# Patient Record
Sex: FEMALE | Race: WHITE | NOT HISPANIC OR LATINO | Employment: UNEMPLOYED | URBAN - METROPOLITAN AREA
[De-identification: names, ages, dates, MRNs, and addresses within clinical notes are randomized per-mention and may not be internally consistent; named-entity substitution may affect disease eponyms.]

---
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
## 2024-01-29 ENCOUNTER — TELEPHONE (OUTPATIENT)
Dept: FAMILY MEDICINE CLINIC | Facility: CLINIC | Age: 19
End: 2024-01-29

## 2024-01-29 NOTE — TELEPHONE ENCOUNTER
Mother is calling to schedule the removal of   Nexplanon for patient.    Mother can be reached at [phone]

## 2024-01-30 NOTE — TELEPHONE ENCOUNTER
Called, mom will talk to daughter about times that would work with her work schedule. Will call back tomorrow.

## 2024-02-22 ENCOUNTER — PROCEDURE VISIT (OUTPATIENT)
Dept: FAMILY MEDICINE CLINIC | Facility: CLINIC | Age: 19
End: 2024-02-22

## 2024-02-22 VITALS
RESPIRATION RATE: 18 BRPM | BODY MASS INDEX: 22.16 KG/M2 | WEIGHT: 120.4 LBS | HEIGHT: 62 IN | OXYGEN SATURATION: 98 % | SYSTOLIC BLOOD PRESSURE: 133 MMHG | DIASTOLIC BLOOD PRESSURE: 88 MMHG | HEART RATE: 81 BPM | TEMPERATURE: 98.7 F

## 2024-02-22 DIAGNOSIS — Z30.46 NEXPLANON REMOVAL: Primary | ICD-10-CM

## 2024-02-22 PROCEDURE — 11982 REMOVE DRUG IMPLANT DEVICE: CPT | Performed by: FAMILY MEDICINE

## 2024-02-22 PROCEDURE — 99213 OFFICE O/P EST LOW 20 MIN: CPT | Performed by: FAMILY MEDICINE

## 2024-02-22 NOTE — PROGRESS NOTES
"Universal Protocol:  Consent: Verbal consent obtained. Written consent not obtained.  Risks and benefits: risks, benefits and alternatives were discussed  Consent given by: patient  Time out: Immediately prior to procedure a \"time out\" was called to verify the correct patient, procedure, equipment, support staff and site/side marked as required.  Patient understanding: patient states understanding of the procedure being performed  Patient consent: the patient's understanding of the procedure matches consent given  Procedure consent: procedure consent matches procedure scheduled  Relevant documents: relevant documents present and verified  Test results: test results available and properly labeled  Site marked: the operative site was marked  Required items: required blood products, implants, devices, and special equipment available  Patient identity confirmed: verbally with patient  Remove and insert drug implant    Date/Time: 2/22/2024 4:20 PM    Performed by: Valeria Marroquin DO  Authorized by: Valeria Marroquin DO    Indication:     Indication: Presence of non-biodegradable drug delivery implant    Pre-procedure:     Pre-procedure timeout performed: yes      Prepped with: alcohol 70% and povidone-iodine      Local anesthetic:  Lidocaine with epinephrine    The site was cleaned and prepped in a sterile fashion: yes    Procedure:     Procedure:  Removal    Small stab incision was made in arm: yes      Left/right:  Left    Preloaded contraceptive capsule trocar was placed subdermally: no      Visualization of implant was obtained: yes      Site was closed with steri-strips and pressure bandage applied: yes    Comments:      0.5 cm incision was made at the distal end of the Nexplanon device.  Device was threaded through insertion site and removed with forceps without difficulty.  Steri-Strips were placed over the site after hemostasis was obtained.      "

## 2024-04-19 ENCOUNTER — OFFICE VISIT (OUTPATIENT)
Dept: FAMILY MEDICINE CLINIC | Facility: CLINIC | Age: 19
End: 2024-04-19

## 2024-04-19 ENCOUNTER — TELEPHONE (OUTPATIENT)
Dept: FAMILY MEDICINE CLINIC | Facility: CLINIC | Age: 19
End: 2024-04-19

## 2024-04-19 VITALS
HEART RATE: 81 BPM | BODY MASS INDEX: 21.49 KG/M2 | DIASTOLIC BLOOD PRESSURE: 83 MMHG | SYSTOLIC BLOOD PRESSURE: 124 MMHG | OXYGEN SATURATION: 98 % | TEMPERATURE: 98.7 F | WEIGHT: 116.8 LBS | HEIGHT: 62 IN | RESPIRATION RATE: 18 BRPM

## 2024-04-19 DIAGNOSIS — Z11.4 SCREENING FOR HIV (HUMAN IMMUNODEFICIENCY VIRUS): ICD-10-CM

## 2024-04-19 DIAGNOSIS — N93.9 ABNORMAL UTERINE AND VAGINAL BLEEDING, UNSPECIFIED: ICD-10-CM

## 2024-04-19 DIAGNOSIS — Z00.00 ANNUAL PHYSICAL EXAM: Primary | ICD-10-CM

## 2024-04-19 DIAGNOSIS — Z11.59 ENCOUNTER FOR HEPATITIS C SCREENING TEST FOR LOW RISK PATIENT: ICD-10-CM

## 2024-04-19 PROCEDURE — 99395 PREV VISIT EST AGE 18-39: CPT | Performed by: FAMILY MEDICINE

## 2024-04-19 NOTE — PROGRESS NOTES
"ADULT ANNUAL PHYSICAL  [facility]    NAME: Laverne Summers  AGE: 18 y.o. SEX: female  : 2005     DATE: 2024     Assessment and Plan:     Problem List Items Addressed This Visit          Genitourinary    Abnormal uterine and vaginal bleeding, unspecified       Other    Annual physical exam - Primary     Other Visit Diagnoses       Screening for HIV (human immunodeficiency virus)        Relevant Orders    HIV 1/2 AG/AB w Reflex [facility] for 2 yr old and above    Encounter for hepatitis C screening test for low risk patient        Relevant Orders    Hepatitis C antibody          18 y.o. female here for annual physical exam.  She has no questions or concerns at this time.  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    Immunization:  - COVID -- 2021, 2021, 2021  - TDaP -- 2005, 2004, 2005  - Influenza -- 10/7/23  Screening: I have discussed each screening test below (per USPSTF guideline) with patient, to which patient expresses understanding and is agreeable to plan  - Obesity -- Negative   - Hypertension -- Negative   - Nicotine/EtOH/Drugs -- Negative   - Depression -- Negative   Depression: Not at risk (2024)    PHQ-2     PHQ-2 Score: 0     - HIV -- Ordered   - Hep C -- Ordered   - T2DM -- Not indicated.   No results found for: \"HGBA1C\"  Lab Results   Component Value Date    GLUF 103 (H) 10/22/2023    CREATININE 0.72 10/22/2023     - Intimate partner violence -- Negative     Immunizations and preventive care screenings were discussed with patient today. Appropriate education was printed on patient's after visit summary.    Counseling:  Alcohol/drug use: discussed moderation in alcohol intake, the recommendations for healthy alcohol use, and avoidance of illicit drug use.  Dental Health: discussed importance of regular tooth brushing, " flossing, and dental visits.  Injury prevention: discussed safety/seat belts, safety helmets, smoke detectors, carbon dioxide detectors, and smoking near bedding or upholstery.  Sexual health: discussed sexually transmitted diseases, partner selection, use of condoms, avoidance of unintended pregnancy, and contraceptive alternatives.  Exercise: the importance of regular exercise/physical activity was discussed. Recommend exercise 3-5 times per week for at least 30 minutes.          No follow-ups on file.     Chief Complaint:     Chief Complaint   Patient presents with    Physical Exam     Annual Physical Exam. No questions or  concerns      History of Present Illness:     Adult Annual Physical   Patient here for a comprehensive physical exam. The patient reports no problems.    Diet and Physical Activity  Diet/Nutrition: well balanced diet.   Exercise: no formal exercise.      Depression Screening  PHQ-2/9 Depression Screening           General Health  Sleep: gets 7-8 hours of sleep on average.   Hearing: normal - bilateral.  Vision: wears glasses.   Dental: regular dental visits.       /GYN Health  Follows with gynecology? no   Last menstrual period: 4/1/2024, not heavy bleeding lasting 5-6 days. Coming monthly   Contraceptive method: oral contraceptives.  History of STDs?: no.     Advanced Care Planning  Do you have an advanced directive? no  Do you have a durable medical power of ? no  ACP document given to the patient? no      Review of Systems:     Review of Systems   Constitutional:  Negative for chills and fever.   Eyes:  Negative for visual disturbance.   Respiratory:  Negative for cough and shortness of breath.    Cardiovascular:  Negative for chest pain and palpitations.   Gastrointestinal:  Negative for abdominal pain and vomiting.   Genitourinary:  Negative for dysuria and hematuria.   Musculoskeletal:  Negative for arthralgias and back pain.   Skin:  Negative for color change and rash.    Neurological:  Negative for dizziness and headaches.   Psychiatric/Behavioral:  Behavioral problem: prev.    All other systems reviewed and are negative.     Past Medical History:     No past medical history on file.   Past Surgical History:     Past Surgical History:   Procedure Laterality Date    TOOTH EXTRACTION  2014      Social History:     Social History     Socioeconomic History    Marital status: Single     Spouse name: None    Number of children: None    Years of education: None    Highest education level: None   Occupational History    None   Tobacco Use    Smoking status: Never    Smokeless tobacco: Never   Vaping Use    Vaping status: Never Used   Substance and Sexual Activity    Alcohol use: Not Currently    Drug use: Never    Sexual activity: Not Currently   Other Topics Concern    None   Social History Narrative    None     Social Determinants of Health     Financial Resource Strain: Low Risk  (4/19/2024)    Overall Financial Resource Strain (CARDIA)     Difficulty of Paying Living Expenses: Not hard at all   Food Insecurity: No Food Insecurity (4/19/2024)    Hunger Vital Sign     Worried About Running Out of Food in the Last Year: Never true     Ran Out of Food in the Last Year: Never true   Transportation Needs: No Transportation Needs (1/25/2023)    PRAPARE - Transportation     Lack of Transportation (Medical): No     Lack of Transportation (Non-Medical): No   Physical Activity: Sufficiently Active (4/19/2024)    Exercise Vital Sign     Days of Exercise per Week: 5 days     Minutes of Exercise per Session: 30 min   Stress: No Stress Concern Present (4/19/2024)    [place] [employer] - Occupational Stress Questionnaire     Feeling of Stress : Not at all   Social Connections: Unknown (4/19/2024)    Social Connection and Isolation Panel [NHANES]     Frequency of Communication with Friends and Family: More than three times a week     Frequency of Social Gatherings with Friends  "and Family: More than three times a week     Attends Christianity Services: Never     Active Member of Clubs or Organizations: No     Attends Club or Organization Meetings: Never     Marital Status: Not on file   Intimate Partner Violence: Not At Risk (4/19/2024)    Humiliation, Afraid, Rape, and Kick questionnaire     Fear of Current or Ex-Partner: No     Emotionally Abused: No     Physically Abused: No     Sexually Abused: No   Housing Stability: Low Risk  (4/19/2024)    Housing Stability Vital Sign     Unable to Pay for Housing in the Last Year: No     Number of Places Lived in the Last Year: 1     Unstable Housing in the Last Year: No      Family History:     Family History   Problem Relation Age of Onset    No Known Problems Mother       Current Medications:     Current Outpatient Medications   Medication Sig Dispense Refill    norgestrel-ethinyl estradiol (Low-Ogestrel) 0.3 mg-30 mcg per tablet Take 1 tablet by mouth daily Take 1 tablet daily 30 tablet 3    norgestrel-ethinyl estradiol (Low-Ogestrel) 0.3 mg-30 mcg per tablet Take 1 tablet by mouth daily Take 1 tablet daily 30 tablet 3     No current facility-administered medications for this visit.      Allergies:     No Known Allergies   Physical Exam:     /83 (BP Location: Left arm, Patient Position: Sitting, Cuff Size: Standard)   Pulse 81   Temp 98.7 °F (37.1 °C) (Temporal)   Resp 18   Ht 5' 2\" (1.575 m)   Wt 53 kg (116 lb 12.8 oz)   SpO2 98%   BMI 21.36 kg/m²     Physical Exam  Vitals reviewed.   Constitutional:       Appearance: Normal appearance.   HENT:      Head: Normocephalic and atraumatic.      Nose: Nose normal.      Mouth/Throat:      Mouth: Mucous membranes are moist.      Pharynx: Oropharynx is clear.   Eyes:      Conjunctiva/sclera: Conjunctivae normal.   Cardiovascular:      Rate and Rhythm: Normal rate and regular rhythm.      Heart sounds: Normal heart sounds. No murmur heard.     No friction rub. No gallop.   Pulmonary:      " Effort: Pulmonary effort is normal.      Breath sounds: No wheezing, rhonchi or rales.   Abdominal:      General: Abdomen is flat. Bowel sounds are normal.      Palpations: Abdomen is soft. There is no mass.      Tenderness: There is no abdominal tenderness.   Musculoskeletal:         General: Normal range of motion.      Cervical back: Neck supple.   Skin:     General: Skin is warm and dry.   Neurological:      Mental Status: She is alert.          DO MAURICIO Sims [facility]

## 2024-05-29 ENCOUNTER — DOCUMENTATION (OUTPATIENT)
Dept: OTHER | Facility: HOSPITAL | Age: 19
End: 2024-05-29

## 2024-05-29 DIAGNOSIS — N93.9 ABNORMAL UTERINE AND VAGINAL BLEEDING, UNSPECIFIED: Primary | ICD-10-CM

## 2024-05-29 RX ORDER — NORGESTREL AND ETHINYL ESTRADIOL 0.3-0.03MG
1 KIT ORAL DAILY
Qty: 90 TABLET | Refills: 11 | Status: SHIPPED | OUTPATIENT
Start: 2024-05-29

## 2025-04-27 ENCOUNTER — OFFICE VISIT (OUTPATIENT)
Dept: URGENT CARE | Age: 20
End: 2025-04-27
Payer: COMMERCIAL

## 2025-04-27 VITALS
OXYGEN SATURATION: 100 % | SYSTOLIC BLOOD PRESSURE: 121 MMHG | WEIGHT: 130 LBS | DIASTOLIC BLOOD PRESSURE: 82 MMHG | BODY MASS INDEX: 22.2 KG/M2 | RESPIRATION RATE: 20 BRPM | HEART RATE: 100 BPM | TEMPERATURE: 98 F | HEIGHT: 64 IN

## 2025-04-27 DIAGNOSIS — H10.31 ACUTE CONJUNCTIVITIS OF RIGHT EYE, UNSPECIFIED ACUTE CONJUNCTIVITIS TYPE: Primary | ICD-10-CM

## 2025-04-27 PROCEDURE — 99203 OFFICE O/P NEW LOW 30 MIN: CPT | Performed by: STUDENT IN AN ORGANIZED HEALTH CARE EDUCATION/TRAINING PROGRAM

## 2025-04-27 RX ORDER — OFLOXACIN 3 MG/ML
2 SOLUTION/ DROPS OPHTHALMIC 4 TIMES DAILY
Qty: 5 ML | Refills: 0 | Status: SHIPPED | OUTPATIENT
Start: 2025-04-27

## 2025-04-27 NOTE — PATIENT INSTRUCTIONS
Please use antibiotic eyedrops as prescribed.  Avoid use of your contacts until symptoms have fully resolved.  Use clean warm compress with a moist warm washcloth 3-4 times daily to help improve your symptoms.    If your symptoms are not adequately improving in the coming week, please schedule a recheck appoint with your PCP.  If you have any severe worsening symptoms such as severe pain, rapidly spreading redness, fevers, loss of vision, please go to the ER for further evaluation.

## 2025-04-27 NOTE — LETTER
April 27, 2025     Patient: Laverne Summers   YOB: 2005   Date of Visit: 4/27/2025       To Whom it May Concern:    Laverne Summers was seen in my clinic on 4/27/2025.  Please excuse her from work on 4/28/2025.    If you have any questions or concerns, please don't hesitate to call.         Sincerely,          Henna Metz, DO

## 2025-04-27 NOTE — PROGRESS NOTES
[facility]        NAME: Laverne Summers is a 19 y.o. female  : 2005    MRN: [id number]  DATE: 2025  TIME: 11:17 AM    Assessment and Plan   Acute conjunctivitis of right eye, unspecified acute conjunctivitis type [H10.31]  1. Acute conjunctivitis of right eye, unspecified acute conjunctivitis type  ofloxacin (OCUFLOX) 0.3 % ophthalmic solution            Patient Instructions   Please use antibiotic eyedrops as prescribed.  Avoid use of your contacts until symptoms have fully resolved.  Use clean warm compress with a moist warm washcloth 3-4 times daily to help improve your symptoms.    If your symptoms are not adequately improving in the coming week, please schedule a recheck appoint with your PCP.  If you have any severe worsening symptoms such as severe pain, rapidly spreading redness, fevers, loss of vision, please go to the ER for further evaluation.    Follow up with PCP in 3-5 days.  Proceed to  ER if symptoms worsen.    If tests have been performed at [facility], our office will contact you with results if changes need to be made to the care plan discussed with you at the visit.  You can review your full results on [facility].    Chief Complaint     Chief Complaint   Patient presents with    Conjunctivitis     Pt has swelling, redness, watering and itching to her right eye.          History of Present Illness       Patient presents for evaluation of right eye symptoms.  Started yesterday, she noticed irritation starting, she put her contact in, but felt they fall out due to the tearing and irritation.  She noticed increased redness of the white of her eye and some puffiness of her eyelid today.  Also noticed crusting overnight.  She did not feel that she scraped the eye with her contact, no other injuries.  Otherwise in her usual state of health.    Conjunctivitis         Review of Systems   Review of Systems   All other systems reviewed and are negative.        Current  "Medications       Current Outpatient Medications:     norgestrel-ethinyl estradiol (Low-Ogestrel) 0.3 mg-30 mcg per tablet, Take 1 tablet by mouth daily Take 1 tablet daily, Disp: 30 tablet, Rfl: 3    ofloxacin (OCUFLOX) 0.3 % ophthalmic solution, Administer 2 drops to the right eye 4 (four) times a day, Disp: 5 mL, Rfl: 0    norgestrel-ethinyl estradiol (Low-Ogestrel) 0.3 mg-30 mcg per tablet, Take 1 tablet by mouth daily Take 1 tablet daily (Patient not taking: Reported on 4/27/2025), Disp: 90 tablet, Rfl: 11    Current Allergies     Allergies as of 04/27/2025    (No Known Allergies)            The following portions of the patient's history were reviewed and updated as appropriate: allergies, current medications, past family history, past medical history, past social history, past surgical history and problem list.     No past medical history on file.    Past Surgical History:   Procedure Laterality Date    TOOTH EXTRACTION  2014       Family History   Problem Relation Age of Onset    No Known Problems Mother          Medications have been verified.        Objective   /82   Pulse 100   Temp 98 °F (36.7 °C)   Resp 20   Ht 5' 4\" (1.626 m)   Wt 59 kg (130 lb)   SpO2 100%   BMI 22.31 kg/m²   No LMP recorded.       Physical Exam     Physical Exam  Vitals and nursing note reviewed.   Constitutional:       General: She is not in acute distress.     Appearance: She is not toxic-appearing.   HENT:      Head: Normocephalic and atraumatic.      Right Ear: External ear normal.      Left Ear: External ear normal.      Nose: Nose normal.      Mouth/Throat:      Mouth: Mucous membranes are moist.   Eyes:      Extraocular Movements: Extraocular movements intact.      Right eye: Normal extraocular motion.      Left eye: Normal extraocular motion.      Conjunctiva/sclera:      Right eye: Right conjunctiva is injected.      Comments: Right eye with scant mucopurulent drainage  Right upper eyelid with mild edema "   Skin:     General: Skin is warm and dry.   Neurological:      Mental Status: She is alert.